# Patient Record
Sex: FEMALE | Race: WHITE | NOT HISPANIC OR LATINO | Employment: FULL TIME | URBAN - METROPOLITAN AREA
[De-identification: names, ages, dates, MRNs, and addresses within clinical notes are randomized per-mention and may not be internally consistent; named-entity substitution may affect disease eponyms.]

---

## 2018-06-16 ENCOUNTER — OFFICE VISIT (OUTPATIENT)
Dept: URGENT CARE | Facility: MEDICAL CENTER | Age: 48
End: 2018-06-16
Payer: COMMERCIAL

## 2018-06-16 VITALS
HEART RATE: 85 BPM | WEIGHT: 235 LBS | SYSTOLIC BLOOD PRESSURE: 126 MMHG | RESPIRATION RATE: 20 BRPM | TEMPERATURE: 98.2 F | DIASTOLIC BLOOD PRESSURE: 82 MMHG

## 2018-06-16 DIAGNOSIS — T63.301A SPIDER BITE WOUND, ACCIDENTAL OR UNINTENTIONAL, INITIAL ENCOUNTER: Primary | ICD-10-CM

## 2018-06-16 PROCEDURE — 90715 TDAP VACCINE 7 YRS/> IM: CPT

## 2018-06-16 PROCEDURE — G0382 LEV 3 HOSP TYPE B ED VISIT: HCPCS | Performed by: PHYSICIAN ASSISTANT

## 2018-06-16 PROCEDURE — S9083 URGENT CARE CENTER GLOBAL: HCPCS | Performed by: PHYSICIAN ASSISTANT

## 2018-06-16 RX ORDER — TETRACYCLINE HYDROCHLORIDE 500 MG/1
500 CAPSULE ORAL 2 TIMES DAILY
Qty: 14 CAPSULE | Refills: 0 | Status: SHIPPED | OUTPATIENT
Start: 2018-06-16 | End: 2018-06-23

## 2018-06-16 NOTE — PATIENT INSTRUCTIONS
1  Take Tetracycline 500mg  1 tablet twice daily x 7 days  2  Use Bactroban topically 3x daily  3  Recommend follow-up in 3-5 days with PCP  4  Proceed to  ER if symptoms worsen

## 2018-06-16 NOTE — PROGRESS NOTES
[facility]        NAME: Hai Caldera is a 49 y o  female  : 1970    MRN: [id number]  DATE: 2018  TIME: 12:34 PM    Assessment and Plan   Spider bite wound, accidental or unintentional, initial encounter [T63 301A]  1  Spider bite wound, accidental or unintentional, initial encounter  tetracycline (ACHROMYCIN,SUMYCIN) 500 MG capsule    mupirocin (BACTROBAN) 2 % ointment         Patient Instructions     1  Take Tetracycline 500mg  1 tablet twice daily x 7 days  2  Use Bactroban topically 3x daily  3  Recommend follow-up in 3-5 days with PCP  4  Proceed to  ER if symptoms worsen  Chief Complaint     Chief Complaint   Patient presents with    Abscess     noticed it 3 days ago, progressively worse, chills, achyness,h/a         History of Present Illness       Patient is a 50-year-old female presents with a painful lesion on her upper back that occurred over the past 2 days  She states the area started as a dry patch", thank progressed to a small pimple, followed by an ulcerated lesion  Patient states she has had no discharge from the wound, she states pain has increased, she has also noticed some tightness and stiffness of her neck  Patient denies any systemic symptoms such as chills, body aches or fever since the onset of her skin lesion  Review of Systems   Review of Systems   Constitutional: Negative  HENT: Negative  Musculoskeletal: Positive for myalgias and neck pain  Skin: Positive for wound           Current Medications       Current Outpatient Prescriptions:     mupirocin (BACTROBAN) 2 % ointment, Apply topically 3 (three) times a day, Disp: 22 g, Rfl: 0    tetracycline (ACHROMYCIN,SUMYCIN) 500 MG capsule, Take 1 capsule (500 mg total) by mouth 2 (two) times a day for 7 days, Disp: 14 capsule, Rfl: 0    Current Allergies     Allergies as of 2018    (No Known Allergies)            The following portions of the patient's history were reviewed and updated as appropriate: allergies, current medications, past family history, past medical history, past social history, past surgical history and problem list      Past Medical History:   Diagnosis Date    No known problems        Past Surgical History:   Procedure Laterality Date    CHOLECYSTECTOMY      LEG SURGERY      WRIST SURGERY         No family history on file  Medications have been verified  Objective   /82 (Patient Position: Sitting)   Pulse 85   Temp 98 2 °F (36 8 °C)   Resp 20   Wt 107 kg (235 lb)        Physical Exam     Physical Exam   Constitutional: She appears well-developed and well-nourished  No distress  Cardiovascular: Normal rate, regular rhythm and normal heart sounds  No murmur heard    Pulmonary/Chest: Effort normal and breath sounds normal    Skin:

## 2018-06-18 ENCOUNTER — OFFICE VISIT (OUTPATIENT)
Dept: URGENT CARE | Facility: MEDICAL CENTER | Age: 48
End: 2018-06-18
Payer: COMMERCIAL

## 2018-06-18 VITALS
TEMPERATURE: 98.6 F | DIASTOLIC BLOOD PRESSURE: 84 MMHG | HEART RATE: 70 BPM | OXYGEN SATURATION: 98 % | HEIGHT: 66 IN | BODY MASS INDEX: 37.93 KG/M2 | RESPIRATION RATE: 16 BRPM | WEIGHT: 236 LBS | SYSTOLIC BLOOD PRESSURE: 125 MMHG

## 2018-06-18 DIAGNOSIS — T63.301S: Primary | ICD-10-CM

## 2018-06-18 PROCEDURE — G0382 LEV 3 HOSP TYPE B ED VISIT: HCPCS | Performed by: FAMILY MEDICINE

## 2018-06-18 PROCEDURE — S9083 URGENT CARE CENTER GLOBAL: HCPCS | Performed by: FAMILY MEDICINE

## 2018-06-18 NOTE — PROGRESS NOTES
[facility]        NAME: Nakia Albert is a 49 y o  female  : 1970    MRN: [id number]  DATE: 2018  TIME: 10:51 AM    Assessment and Plan   Spider bite allergy, current reaction, accidental or unintentional, sequela [T63 301S]  1  Spider bite allergy, current reaction, accidental or unintentional, sequela           Patient Instructions     Patient Instructions   Keep wound clean dry and intact  Monitor for increasing signs of infection: redness, warmth to touch, fever/chills, nausea/vomiting,  discharge, red streaking  Cont tetracycline and muporcine cream as directed  Follow up with PCP in 1-2 days  Go to the ER for worsening symptoms  Mouna Cara Recluse Spider Bite   WHAT YOU NEED TO KNOW:   Brown recluse spiders are poisonous  A bite wound may heal on its own, but you will need treatment if the wound gets worse  The venom may cause severe skin and tissue damage after several hours or days  DISCHARGE INSTRUCTIONS:   Medicines:   · Antibiotics: This medicine will help fight or prevent an infection  Take your antibiotics until they are gone, even if you feel better  · NSAIDs  help decrease swelling and pain or fever  This medicine is available with or without a doctor's order  NSAIDs can cause stomach bleeding or kidney problems in certain people  If you take blood thinner medicine, always ask your healthcare provider if NSAIDs are safe for you  Always read the medicine label and follow directions  · Pain medicine: You may be given medicine to take away or decrease pain  Do not wait until the pain is severe before you take your medicine  · Take your medicine as directed  Contact your healthcare provider if you think your medicine is not helping or if you have side effects  Tell him of her if you are allergic to any medicine  Keep a list of the medicines, vitamins, and herbs you take  Include the amounts, and when and why you take them   Bring the list or the pill bottles to follow-up visits  Carry your medicine list with you in case of an emergency  Follow up with your healthcare provider as directed:  Write down your questions so you remember to ask them during your visits  Ice the wound:  Ice helps decrease swelling and pain  Put ice in a plastic bag  Wrap the bag with a towel and put it on the site of the spider bite for 10 to 20 minutes  Elevate the wound:  Keep the bite area above the level of your heart to help decrease redness and swelling  If you were bitten on the arm or leg, prop it on pillows to keep the area elevated comfortably  Compress the wound:  A compression bandage around the wound can reduce pain and swelling  Wound care:   · Wash your hands before and after you take care of your wound  · Clean your wound with mild soap and water, and pat dry  Do this as often as ordered by your healthcare provider  If you cannot reach the wound, have someone help you  · Carefully check the wound and the area around it  Watch for more swelling, redness, or fluid oozing out of it  If there is bleeding, you may apply gentle pressure  · Cover your wound with a layer of sterile gauze bandage or other dressing as ordered by your healthcare provider  If the bandage should be wrapped around your arm or leg, wrap it snugly but not too tight  It is too tight if you feel tingling or lose feeling in that area  · Keep the bandage clean and dry  Contact your healthcare provider if:   · You have a rash, itching, or swelling after you take your medicine  · The bite becomes red and swollen  · You have pain or problems moving the injured part or get tender lumps in the groin or armpits  · Your wound continues to get larger  · You have questions or concerns about your condition or care  Return to the emergency department if:   · You have a fever or chills  · The skin around your wound gets red, or the wound gets more painful      · You have a headache, or nausea and vomiting  · You have numbness or tingling in the bite area  · You have trouble talking, walking, or breathing  · Your urine is darker, or you urinate less than is usual for you  · Your wound does not stop bleeding even after you apply pressure  · Your wound or bandage has pus or a bad smell  © 2017 [employer] Information is for End User's use only and may not be sold, redistributed or otherwise used for commercial purposes  All illustrations and images included in CareNotes® are the copyrighted property of [employer]  or [employer]  The above information is an  only  It is not intended as medical advice for individual conditions or treatments  Talk to your doctor, nurse or pharmacist before following any medical regimen to see if it is safe and effective for you  days  Patient Instructions   Keep wound clean dry and intact  Monitor for increasing signs of infection: redness, warmth to touch, fever/chills, nausea/vomiting,  discharge, red streaking  Cont tetracycline and muporcine cream as directed  Follow up with PCP in 1-2 days  Go to the ER for worsening symptoms  Sue Sabina Recluse Spider Bite   WHAT YOU NEED TO KNOW:   Brown recluse spiders are poisonous  A bite wound may heal on its own, but you will need treatment if the wound gets worse  The venom may cause severe skin and tissue damage after several hours or days  DISCHARGE INSTRUCTIONS:   Medicines:   · Antibiotics: This medicine will help fight or prevent an infection  Take your antibiotics until they are gone, even if you feel better  · NSAIDs  help decrease swelling and pain or fever  This medicine is available with or without a doctor's order  NSAIDs can cause stomach bleeding or kidney problems in certain people  If you take blood thinner medicine, always ask your healthcare provider if NSAIDs are safe for you   Always read the medicine label and follow directions  · Pain medicine: You may be given medicine to take away or decrease pain  Do not wait until the pain is severe before you take your medicine  · Take your medicine as directed  Contact your healthcare provider if you think your medicine is not helping or if you have side effects  Tell him of her if you are allergic to any medicine  Keep a list of the medicines, vitamins, and herbs you take  Include the amounts, and when and why you take them  Bring the list or the pill bottles to follow-up visits  Carry your medicine list with you in case of an emergency  Follow up with your healthcare provider as directed:  Write down your questions so you remember to ask them during your visits  Ice the wound:  Ice helps decrease swelling and pain  Put ice in a plastic bag  Wrap the bag with a towel and put it on the site of the spider bite for 10 to 20 minutes  Elevate the wound:  Keep the bite area above the level of your heart to help decrease redness and swelling  If you were bitten on the arm or leg, prop it on pillows to keep the area elevated comfortably  Compress the wound:  A compression bandage around the wound can reduce pain and swelling  Wound care:   · Wash your hands before and after you take care of your wound  · Clean your wound with mild soap and water, and pat dry  Do this as often as ordered by your healthcare provider  If you cannot reach the wound, have someone help you  · Carefully check the wound and the area around it  Watch for more swelling, redness, or fluid oozing out of it  If there is bleeding, you may apply gentle pressure  · Cover your wound with a layer of sterile gauze bandage or other dressing as ordered by your healthcare provider  If the bandage should be wrapped around your arm or leg, wrap it snugly but not too tight  It is too tight if you feel tingling or lose feeling in that area  · Keep the bandage clean and dry    Contact your healthcare provider if:   · You have a rash, itching, or swelling after you take your medicine  · The bite becomes red and swollen  · You have pain or problems moving the injured part or get tender lumps in the groin or armpits  · Your wound continues to get larger  · You have questions or concerns about your condition or care  Return to the emergency department if:   · You have a fever or chills  · The skin around your wound gets red, or the wound gets more painful  · You have a headache, or nausea and vomiting  · You have numbness or tingling in the bite area  · You have trouble talking, walking, or breathing  · Your urine is darker, or you urinate less than is usual for you  · Your wound does not stop bleeding even after you apply pressure  · Your wound or bandage has pus or a bad smell  © 2017 [employer]  Information is for End User's use only and may not be sold, redistributed or otherwise used for commercial purposes  All illustrations and images included in CareNotes® are the copyrighted property of [employer]  or [employer]  The above information is an  only  It is not intended as medical advice for individual conditions or treatments  Talk to your doctor, nurse or pharmacist before following any medical regimen to see if it is safe and effective for you  days  Follow up with PCP in 3-5 days  Proceed to  ER if symptoms worsen  Chief Complaint     Chief Complaint   Patient presents with   [address]     On back, was seen here Saturday, has been taking antibiotics  Would like it looked at again  History of Present Illness       48 y/o female here for wound check  Pt reports she was bitten by a spider on Wednesday  Pt was seen on Saturday and place on tetracycline and mupirocin  Pt reports it is still very tender touch  Pt reports it is a little bit better since Saturday but more less unchanged   Denies any fever/chills, nausea, vomiting, chest pain shortness of breath  Pt is made worse if clothing rubbing against bite or leaning against objects  Review of Systems   Review of Systems   Constitutional: Negative for chills and fever  Respiratory: Negative  Cardiovascular: Negative  Skin: Positive for wound  Current Medications       Current Outpatient Prescriptions:     mupirocin (BACTROBAN) 2 % ointment, Apply topically 3 (three) times a day, Disp: 22 g, Rfl: 0    tetracycline (ACHROMYCIN,SUMYCIN) 500 MG capsule, Take 1 capsule (500 mg total) by mouth 2 (two) times a day for 7 days, Disp: 14 capsule, Rfl: 0    Current Allergies     Allergies as of 06/18/2018    (No Known Allergies)            The following portions of the patient's history were reviewed and updated as appropriate: allergies, current medications, past family history, past medical history, past social history, past surgical history and problem list      Past Medical History:   Diagnosis Date    No known problems        Past Surgical History:   Procedure Laterality Date    CHOLECYSTECTOMY      LEG SURGERY      WRIST SURGERY         No family history on file  Medications have been verified          Objective   /84 (BP Location: Right arm, Patient Position: Sitting)   Pulse 70   Temp 98 6 °F (37 °C) (Temporal)   Resp 16   Ht 5' 6" (1 676 m)   Wt 107 kg (236 lb)   SpO2 98%   BMI 38 09 kg/m²        Physical Exam     Physical Exam   Skin:

## 2018-06-18 NOTE — LETTER
June 18, 2018     Patient: Angella Greco   YOB: 1970   Date of Visit: 6/18/2018       To Whom It May Concern: It is my medical opinion that Angella Greco please excuse illness  If you have any questions or concerns, please don't hesitate to call           Sincerely,        [facility]    CC: No Recipients

## 2018-06-18 NOTE — PATIENT INSTRUCTIONS
Keep wound clean dry and intact  Monitor for increasing signs of infection: redness, warmth to touch, fever/chills, nausea/vomiting,  discharge, red streaking  Cont tetracycline and muporcine cream as directed  Follow up with PCP in 1-2 days  Go to the ER for worsening symptoms  Quoc Rosales Recluse Spider Bite   WHAT YOU NEED TO KNOW:   Brown recluse spiders are poisonous  A bite wound may heal on its own, but you will need treatment if the wound gets worse  The venom may cause severe skin and tissue damage after several hours or days  DISCHARGE INSTRUCTIONS:   Medicines:   · Antibiotics: This medicine will help fight or prevent an infection  Take your antibiotics until they are gone, even if you feel better  · NSAIDs  help decrease swelling and pain or fever  This medicine is available with or without a doctor's order  NSAIDs can cause stomach bleeding or kidney problems in certain people  If you take blood thinner medicine, always ask your healthcare provider if NSAIDs are safe for you  Always read the medicine label and follow directions  · Pain medicine: You may be given medicine to take away or decrease pain  Do not wait until the pain is severe before you take your medicine  · Take your medicine as directed  Contact your healthcare provider if you think your medicine is not helping or if you have side effects  Tell him of her if you are allergic to any medicine  Keep a list of the medicines, vitamins, and herbs you take  Include the amounts, and when and why you take them  Bring the list or the pill bottles to follow-up visits  Carry your medicine list with you in case of an emergency  Follow up with your healthcare provider as directed:  Write down your questions so you remember to ask them during your visits  Ice the wound:  Ice helps decrease swelling and pain  Put ice in a plastic bag  Wrap the bag with a towel and put it on the site of the spider bite for 10 to 20 minutes    Elevate the wound: Keep the bite area above the level of your heart to help decrease redness and swelling  If you were bitten on the arm or leg, prop it on pillows to keep the area elevated comfortably  Compress the wound:  A compression bandage around the wound can reduce pain and swelling  Wound care:   · Wash your hands before and after you take care of your wound  · Clean your wound with mild soap and water, and pat dry  Do this as often as ordered by your healthcare provider  If you cannot reach the wound, have someone help you  · Carefully check the wound and the area around it  Watch for more swelling, redness, or fluid oozing out of it  If there is bleeding, you may apply gentle pressure  · Cover your wound with a layer of sterile gauze bandage or other dressing as ordered by your healthcare provider  If the bandage should be wrapped around your arm or leg, wrap it snugly but not too tight  It is too tight if you feel tingling or lose feeling in that area  · Keep the bandage clean and dry  Contact your healthcare provider if:   · You have a rash, itching, or swelling after you take your medicine  · The bite becomes red and swollen  · You have pain or problems moving the injured part or get tender lumps in the groin or armpits  · Your wound continues to get larger  · You have questions or concerns about your condition or care  Return to the emergency department if:   · You have a fever or chills  · The skin around your wound gets red, or the wound gets more painful  · You have a headache, or nausea and vomiting  · You have numbness or tingling in the bite area  · You have trouble talking, walking, or breathing  · Your urine is darker, or you urinate less than is usual for you  · Your wound does not stop bleeding even after you apply pressure  · Your wound or bandage has pus or a bad smell    © 2017 [employer] Information is for End User's use only and may not be sold, redistributed or otherwise used for commercial purposes  All illustrations and images included in CareNotes® are the copyrighted property of [employer]  or [employer]  The above information is an  only  It is not intended as medical advice for individual conditions or treatments  Talk to your doctor, nurse or pharmacist before following any medical regimen to see if it is safe and effective for you  days

## 2020-03-01 ENCOUNTER — HOSPITAL ENCOUNTER (EMERGENCY)
Facility: HOSPITAL | Age: 50
Discharge: HOME | End: 2020-03-01
Attending: EMERGENCY MEDICINE | Admitting: EMERGENCY MEDICINE
Payer: OTHER MISCELLANEOUS

## 2020-03-01 VITALS
HEART RATE: 98 BPM | OXYGEN SATURATION: 96 % | SYSTOLIC BLOOD PRESSURE: 161 MMHG | DIASTOLIC BLOOD PRESSURE: 91 MMHG | TEMPERATURE: 98.6 F | RESPIRATION RATE: 18 BRPM
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
## 2020-03-01 DIAGNOSIS — Z77.21 EXPOSURE TO BLOOD OR BODY FLUID: Primary | ICD-10-CM

## 2020-03-01 PROCEDURE — 99283 EMERGENCY DEPT VISIT LOW MDM: CPT

## 2020-03-01 PROCEDURE — 99283 EMERGENCY DEPT VISIT LOW MDM: CPT | Performed by: PHYSICIAN ASSISTANT
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
